# Patient Record
Sex: FEMALE
[De-identification: names, ages, dates, MRNs, and addresses within clinical notes are randomized per-mention and may not be internally consistent; named-entity substitution may affect disease eponyms.]

---

## 2021-10-31 ENCOUNTER — NURSE TRIAGE (OUTPATIENT)
Dept: OTHER | Facility: CLINIC | Age: 26
End: 2021-10-31

## 2021-10-31 NOTE — TELEPHONE ENCOUNTER
Brief description of triage: swollen lymph node, sore throat, right ear pain    Triage indicates for patient to see provider within 24 hours     Care advice provided, patient verbalizes understanding; denies any other questions or concerns; instructed to call back for any new or worsening symptoms. This triage is a result of a call to [address] [place] [address]. Please do not respond to the triage nurse through this encounter. Any subsequent communication should be directly with the patient. Reason for Disposition   [1] Single large node AND [2] size > 1 inch (2.5 cm) AND [3] no fever    Answer Assessment - Initial Assessment Questions  1. LOCATION: \"Where is the swollen node located? \" \"Is the matching node on the other side of the body also swollen? \"       Right side of neck and ear     2. SIZE: \"How big is the node? \" (Inches or centimeters) (or compare to common objects such as pea, bean, marble, golf ball)       Golf ball     3. ONSET: \"When did the swelling start? \"       One week ago     4. NECK NODES: \"Is there a sore throat, runny nose or other symptoms of a cold? \"       Earache, sore throat     5. GROIN OR ARMPIT NODES: \"Is there a sore, scratch, cut or painful red area on that arm or leg? \"       Denies     6. FEVER: \"Do you have a fever? \" If so, ask: \"What is it, how was it measured, and when did it start? \"       Denies     7. CAUSE: \"What do you think is causing the swollen lymph nodes? \"      Not sure     8. OTHER SYMPTOMS: \"Do you have any other symptoms? \"      Ear pain     9. PREGNANCY: \"Is there any chance you are pregnant? \" \"When was your last menstrual period? \"      Denies    Protocols used: LYMPH NODES - [facility]